# Patient Record
(demographics unavailable — no encounter records)

---

## 2024-10-15 NOTE — ASSESSMENT
[FreeTextEntry1] : Anxiety/depression/tension headaches.-cont paxil,xanax. urged to see psych. referred to [facility].  ANGELO- cpap, urged to f/u with pulm for retitration study Prediabetes- d/e/wl, chk labs. IBS-Constipation- fluids, fiber, exc, saw multiple gis- ash, alana, chuck. had ct a/p, lactulose breath test, colon. - findings- tics and mesen adenit, sibo. abx trial failed. advised [employer], ibgard, metamucil, more water-5 small bottles / day, dietary fiber/ excercise, walk 30 min / day. can try gluten free diet ( elimination diet)will reeval in 1m Headache- chronic tension type/ migraine. .now seeing dr guzmán and scheduled for vyepti Tinnitus- discussed at length. saw ent. w/u neg. it doesn't bother him that much during the day- if tv is on, he doesn't here it.discussed two approches- audiology/hearing aids and cbt, gave website for dr silva obesity- discussed low calorie diet and exercise as part of a weight loss plan.  healthy lifestyle, d/e, chk labs, reviewed immunizations and prev. health measures, chk psa, rec. colonoscopy  Discussed the importance and benefit of a healthy lifestyle including a heart healthy diet such as the Mediterranean diet and regular exercise as well as 7 hours of sleep nightly. discussed low calorie diet and exercise as part of a weight loss plan.

## 2024-10-15 NOTE — HISTORY OF PRESENT ILLNESS
[FreeTextEntry1] : chrystal [de-identified] : chronic stomach aches. saw gi recently- dr warner- ct a/p - tics and mild mesenteric adenitis. , colonoscopy- 9/23/24- tics. otherwise neg, lactulose breath test pos- was given an antibiotic per mother, no improvement.  has some constipation but been better recently, was advised to start metamucil by gi but hasnt started it yet decl vaccines Otherwise feels good. Appet, sleep,, voiding, mood all ok. chronic headaches- seeing dr guzmán- scheduled for vyepti Reviewed all interim as well as relevant prior consultations, labs and radiological studies.

## 2024-10-15 NOTE — HEALTH RISK ASSESSMENT
[Good] : ~his/her~  mood as  good [Never (0 pts)] : Never (0 points) [No] : In the past 12 months have you used drugs other than those required for medical reasons? No [No falls in past year] : Patient reported no falls in the past year [0] : 2) Feeling down, depressed, or hopeless: Not at all (0) [PHQ-2 Negative - No further assessment needed] : PHQ-2 Negative - No further assessment needed [Never] : Never [Patient reported colonoscopy was normal] : Patient reported colonoscopy was normal [Handling Complex Tasks] : difficulty handling complex tasks [Transportation] : transportation [With Family] : lives with family [On disability] : on disability [Single] : single [Fully functional (bathing, dressing, toileting, transferring, walking, feeding)] : Fully functional (bathing, dressing, toileting, transferring, walking, feeding) [Audit-CScore] : 0 [de-identified] : walks [de-identified] : healthy [TRB2Fvndp] : 0 [Change in mental status noted] : No change in mental status noted [Language] : denies difficulty with language [Behavior] : denies difficulty with behavior [Reasoning] : denies difficulty with reasoning [Reports changes in hearing] : Reports no changes in hearing [Reports changes in vision] : Reports no changes in vision [Reports changes in dental health] : Reports no changes in dental health [Smoke Detector] : no smoke detector [ColonoscopyDate] : 2024 [de-identified] : needs assist [AdvancecareDate] : 10/15/24

## 2024-10-21 NOTE — HISTORY OF PRESENT ILLNESS
[4] : 4 [Denies] : Denies [No] : No [Yes] : Yes [Informed consent documented in EHR.] : Informed consent documented in EHR. [Right upper extremity] : Right upper extremity [22g] : 22g [IV discontinued. Intact. No signs or symptoms of IV complications noted. Time: ___] : IV discontinued. Intact. No signs or symptoms of IV complications noted. Time: [unfilled] [Patient  instructed to seek medical attention with signs and symptoms of adverse effects] : Patient  instructed to seek medical attention with signs and symptoms of adverse effects [Patient left unit in no acute distress] : Patient left unit in no acute distress [Medications administered as ordered and tolerated well.] : Medications administered as ordered and tolerated well. [de-identified] : AC [de-identified] : 1003 [de-identified] : Generic: Eptinezumab-JJMR 1mg Dx: Complicated migraine (G43.109) Provider Treatment Prescriber: Chencho Total Dose Administered: 100mg                            Start: 1010                        Stop: 1043 Amount of drug wasted: n/a Infusion rate: 200mL/hr in 100mL 0.9%NS 1001  149/92, 93, 17 1047  143/89, 92, 17 IV insertion time: 1005                        IV D/C time: 1045 NDC# 99932-755-67                      Lot# [id number]              Exp: 10/2026 Buy and Bill: Y Was there a medication reaction? n/a Action taken: n/a  CPT: 03854 Next Appointment: 1/13/25 at 1000. Salena tasked.

## 2024-11-20 NOTE — ASSESSMENT
[FreeTextEntry1] : Anxiety/depression/tension headaches.-cont paxil,xanax. seeing psych- meds being reviewed.  ANGELO- cpap, urged to f/u with pulm for retitration study Prediabetes- d/e/wl, chk labs. IBS-Constipation- fluids, fiber, exc, saw multiple gis- alana aleman marshall. had ct a/p, lactulose breath test, colon. - findings- tics and mesen adenit, sibo. abx trial failed- probiotic/ibguard/gluten free- all no help. ibd pos for crohns. egd planned- dr woods. f/u with gi.  Headache- chronic tension type/ migraine. .now seeing dr arielle ray no help- due to cervical hnp? to see neurosurg Tinnitus- discussed at length. saw ent. w/u neg. it doesn't bother him that much during the day- if tv is on, he doesn't here it.discussed two approches- audiology/hearing aids and cbt, gave website for dr silva obesity- discussed low calorie diet and exercise as part of a weight loss plan.  healthy lifestyle, d/e, chk labs, reviewed immunizations and prev. health measures, chk psa, rec. colonoscopy  Discussed the importance and benefit of a healthy lifestyle including a heart healthy diet such as the Mediterranean diet and regular exercise as well as 7 hours of sleep nightly. discussed low calorie diet and exercise as part of a weight loss plan. Total time 30 min ( 20 min. FTF and 10 min chart review and documentation.)

## 2024-11-20 NOTE — HISTORY OF PRESENT ILLNESS
[FreeTextEntry1] : The patient is here for a follow up visit to review their medications and chronic medical conditions. headache, gi sympt [de-identified] : seeing dr woods , egd planned. ibd pos crohns chronic headaches- saw flro penny- no help.  Reviewed all interim as well as relevant prior consultations, labs and radiological studies.

## 2024-11-20 NOTE — ASSESSMENT
[FreeTextEntry1] : Anxiety/depression/tension headaches.-cont paxil,xanax. seeing psych- meds being reviewed.  ANGELO- cpap, urged to f/u with pulm for retitration study Prediabetes- d/e/wl, chk labs. IBS-Constipation- fluids, fiber, exc, saw multiple gis- alnaa aleman marshall. had ct a/p, lactulose breath test, colon. - findings- tics and mesen adenit, sibo. abx trial failed- probiotic/ibguard/gluten free- all no help. ibd pos for crohns. egd planned- dr woods. f/u with gi.  Headache- chronic tension type/ migraine. .now seeing dr arielle ray no help- due to cervical hnp? to see neurosurg Tinnitus- discussed at length. saw ent. w/u neg. it doesn't bother him that much during the day- if tv is on, he doesn't here it.discussed two approches- audiology/hearing aids and cbt, gave website for dr silva obesity- discussed low calorie diet and exercise as part of a weight loss plan.  healthy lifestyle, d/e, chk labs, reviewed immunizations and prev. health measures, chk psa, rec. colonoscopy  Discussed the importance and benefit of a healthy lifestyle including a heart healthy diet such as the Mediterranean diet and regular exercise as well as 7 hours of sleep nightly. discussed low calorie diet and exercise as part of a weight loss plan. Total time 30 min ( 20 min. FTF and 10 min chart review and documentation.)

## 2024-11-20 NOTE — HISTORY OF PRESENT ILLNESS
[FreeTextEntry1] : The patient is here for a follow up visit to review their medications and chronic medical conditions. headache, gi sympt [de-identified] : seeing dr woods , egd planned. ibd pos crohns chronic headaches- saw [facility] penny- no help.  Reviewed all interim as well as relevant prior consultations, labs and radiological studies.

## 2025-05-22 NOTE — HISTORY OF PRESENT ILLNESS
[FreeTextEntry1] : Pt and mother note that he has had worse headaches more recently and wonders if related to use of candesartan.  Has continued to have elevated BP and mother notes that his father uses ace inhibitor for bp and wondering if it may be appropriate. Did cut down meds and opn paxil 20mg and 8mg candasartan.coq-10,mg, centrum.

## 2025-07-07 NOTE — HISTORY OF PRESENT ILLNESS
[FreeTextEntry1] : headaches [de-identified] : chronic complaints x yrs headaches/fatigue/poor sleep- has seen neurology, had full w/u and tried many meds- all ineffective- tried mult cgrp blockers including vyepti- no help.  R heel pain x 6 mos, saw pod. inserts ordered xray noted. seeing psychiatry - dr nirav covington.  s/p vyepti- no improvement.  HA is holocephalic. no p/p/p.  appet, bms/voiding all ok recently changed to lisinopril and has a cough Reviewed all interim as well as relevant prior consultations, labs and radiological studies.

## 2025-07-07 NOTE — ASSESSMENT
[FreeTextEntry1] : Anxiety/depression/tension headaches.-cont paxil,30mg. seeing psych- meds being reviewed.  ANGELO- cpap, urged to f/u with pulm for retitration study Prediabetes- d/e/wl, chk labs. IBS-Constipation- fluids, fiber, exc, saw multiple gis- alana aleman marshall. had ct a/p, lactulose breath test, colon. - findings- tics and mesen adenit, sibo. abx trial failed- probiotic/ibguard/gluten free- all no help. ibd pos for crohns. egd planned- dr woods. f/u with gi.  Headache- chronic tension type/ migraine. .now seeing dr arielle ray no help- pt doesnt sleep well- perhaps headaches are due to poor / fragmented sleep. he uses cpap at night- efficacy? but doesnt use it during the day during 1-2 hr naps. trial ambien- will try to focus on meds to help him sleep and perhaps his headaches will improve as a result. taking mag gly 200mg qhs, advisd to add riboflavin and fish oil.  Tinnitus- discussed at length. saw ent. w/u neg. it doesn't bother him that much during the day- if tv is on, he doesn't here it.discussed two approches- audiology/hearing aids and cbt, gave website for dr silva obesity- discussed low calorie diet and exercise as part of a weight loss plan.  healthy lifestyle, d/e, chk labs, reviewed immunizations and prev. health measures, chk psa, rec. colonoscopy  Discussed the importance and benefit of a healthy lifestyle including a heart healthy diet such as the Mediterranean diet and regular exercise as well as 7 hours of sleep nightly. discussed low calorie diet and exercise as part of a weight loss plan. stable/continue taking all prescribed medications as reviewed with patient and documented above.at       [Vaccines Reviewed] : Immunizations reviewed today. Please see immunization details in the vaccine log within the immunization flowsheet.

## 2025-07-21 NOTE — HISTORY OF PRESENT ILLNESS
[FreeTextEntry1] : The patient is here for a follow up visit to review their medications and chronic medical conditions.  chronic headaches [de-identified] : bp is better.  headaches unchanged. yrs, has tried every modalitiy and medicaiton. nothing helps. has seen Liam Gupta many times over years.  has alan, uses cpap naps during the day sees psych appet, bms,voiding ok Reviewed all interim as well as relevant prior consultations, labs and radiological studies.